# Patient Record
(demographics unavailable — no encounter records)

---

## 2018-04-28 NOTE — EKG
[place] [address]
Phone:  [phone]                     ELECTROCARDIOGRAM REPORT      
_______________________________________________________________________________
 
Name:       SAMAN PUENTE            Room:                      [facility]#:  [id number]      Account #:      [id number]  
Admission:  18     Attend Phys:                         
Discharge:  18     Date of Birth:  53  
         Report #: [id number]
    [id number]
_______________________________________________________________________________
THIS REPORT FOR:  //name//                      
 
                         [facility] ED
                                       
Test Date:    2018               Test Time:    00:48:23
Pat Name:     SAMAN PUENTE        Department:   
Patient ID:   [id number]            Room:          
Gender:       F                        Technician:   KOSTAS VASQUEZ
:          1953               Requested By: Rivera Roberts
Order Number: [id number] MD:   Ghulam Scott
                                 Measurements
Intervals                              Axis          
Rate:         67                       P:            43
TN:           191                      QRS:          1
QRSD:         97                       T:            60
QT:           416                                    
QTc:          439                                    
                           Interpretive Statements
Sinus rhythm
Baseline wander in lead(s) V3
Compared to ECG 2017 10:37:55
No significant changes
 
Electronically Signed On 2018 8:36:49 CDT by Ghulam Scott
[email or web address]
 
 
 
 
 
 
 
 
 
 
 
 
 
 
 
 
 
 
  <ELECTRONICALLY SIGNED>
                                           By: Ghulam Scott MD, [facility]   
  18     0836
D: 18 0048   _____________________________________
T: 18   Ghulam Scott MD, [facility]     /EPI